# Patient Record
Sex: MALE | Race: WHITE | NOT HISPANIC OR LATINO
[De-identification: names, ages, dates, MRNs, and addresses within clinical notes are randomized per-mention and may not be internally consistent; named-entity substitution may affect disease eponyms.]

---

## 2018-07-23 ENCOUNTER — RX RENEWAL (OUTPATIENT)
Age: 68
End: 2018-07-23

## 2018-10-05 ENCOUNTER — APPOINTMENT (OUTPATIENT)
Dept: PULMONOLOGY | Facility: CLINIC | Age: 68
End: 2018-10-05

## 2018-10-08 ENCOUNTER — MEDICATION RENEWAL (OUTPATIENT)
Age: 68
End: 2018-10-08

## 2018-10-30 ENCOUNTER — APPOINTMENT (OUTPATIENT)
Dept: PULMONOLOGY | Facility: CLINIC | Age: 68
End: 2018-10-30
Payer: MEDICARE

## 2018-10-30 VITALS
BODY MASS INDEX: 24.29 KG/M2 | DIASTOLIC BLOOD PRESSURE: 57 MMHG | WEIGHT: 164 LBS | HEIGHT: 69 IN | OXYGEN SATURATION: 96 % | TEMPERATURE: 98.3 F | HEART RATE: 62 BPM | SYSTOLIC BLOOD PRESSURE: 96 MMHG

## 2018-10-30 PROCEDURE — 94060 EVALUATION OF WHEEZING: CPT

## 2018-10-30 PROCEDURE — 94729 DIFFUSING CAPACITY: CPT

## 2018-10-30 PROCEDURE — 90662 IIV NO PRSV INCREASED AG IM: CPT

## 2018-10-30 PROCEDURE — G0008: CPT

## 2018-10-30 PROCEDURE — 99214 OFFICE O/P EST MOD 30 MIN: CPT | Mod: 25

## 2018-10-30 PROCEDURE — 94727 GAS DIL/WSHOT DETER LNG VOL: CPT

## 2018-10-30 RX ORDER — BUDESONIDE AND FORMOTEROL FUMARATE DIHYDRATE 160; 4.5 UG/1; UG/1
160-4.5 AEROSOL RESPIRATORY (INHALATION) TWICE DAILY
Qty: 1 | Refills: 1 | Status: DISCONTINUED | COMMUNITY
Start: 2018-10-08 | End: 2018-10-30

## 2018-11-14 ENCOUNTER — MEDICATION RENEWAL (OUTPATIENT)
Age: 68
End: 2018-11-14

## 2019-05-21 ENCOUNTER — APPOINTMENT (OUTPATIENT)
Dept: PULMONOLOGY | Facility: CLINIC | Age: 69
End: 2019-05-21

## 2019-07-09 ENCOUNTER — APPOINTMENT (OUTPATIENT)
Dept: PULMONOLOGY | Facility: CLINIC | Age: 69
End: 2019-07-09
Payer: MEDICARE

## 2019-07-09 VITALS
OXYGEN SATURATION: 96 % | DIASTOLIC BLOOD PRESSURE: 70 MMHG | HEART RATE: 64 BPM | SYSTOLIC BLOOD PRESSURE: 110 MMHG | RESPIRATION RATE: 12 BRPM

## 2019-07-09 PROCEDURE — 77080 DXA BONE DENSITY AXIAL: CPT

## 2019-07-09 PROCEDURE — 99214 OFFICE O/P EST MOD 30 MIN: CPT | Mod: 25

## 2019-07-09 PROCEDURE — 94060 EVALUATION OF WHEEZING: CPT

## 2019-07-09 NOTE — PHYSICAL EXAM
[General Appearance - Well Nourished] : well nourished [General Appearance - Well Developed] : well developed [Heart Sounds] : normal S1 and S2 [Lungs Percussion] : the lungs were normal to percussion [Abdomen Soft] : soft [Auscultation Breath Sounds / Voice Sounds] : lungs were clear to auscultation bilaterally [Cyanosis, Localized] : no localized cyanosis [Nail Clubbing] : no clubbing of the fingernails [Abdomen Tenderness] : non-tender [] : no ischemic changes [Petechial Hemorrhages (___cm)] : no petechial hemorrhages

## 2019-07-09 NOTE — DISCUSSION/SUMMARY
[FreeTextEntry1] : Asthma meets goals. Denies significant nocturnal symptoms. Rare beta agonist use. Denies significant cough, wheezing, SOB.\par \par Osteopenia relatively stable.\par \par Bronchiectasis without recent exacerbations.

## 2019-07-09 NOTE — HISTORY OF PRESENT ILLNESS
[FreeTextEntry1] : Feeling good , increased exercise and uses proair before with help. No recent uri, no cough or wheeze\par \par Off Fosamax greater than 2 years ago and de for f/u bone density . taking vit d only

## 2019-07-09 NOTE — ASSESSMENT
[FreeTextEntry1] : Continue present bronchodilator therapy\par Calcium/VITamin D/Exercise as treatment for bone loss discussed.\par

## 2019-07-09 NOTE — PROCEDURE
[FreeTextEntry1] : Pulmonary function testing.\par These data demonstrate a mild obstructive ventilatory deficit. There is no significant bronchodilator response. \par \par BMD Ostoepenia Spine improved. Hip mild dec. BMD

## 2019-09-06 ENCOUNTER — RX RENEWAL (OUTPATIENT)
Age: 69
End: 2019-09-06

## 2020-01-28 ENCOUNTER — APPOINTMENT (OUTPATIENT)
Dept: PULMONOLOGY | Facility: CLINIC | Age: 70
End: 2020-01-28
Payer: MEDICARE

## 2020-01-28 VITALS
SYSTOLIC BLOOD PRESSURE: 108 MMHG | HEART RATE: 62 BPM | RESPIRATION RATE: 14 BRPM | BODY MASS INDEX: 23.7 KG/M2 | WEIGHT: 160 LBS | OXYGEN SATURATION: 97 % | HEIGHT: 69 IN | DIASTOLIC BLOOD PRESSURE: 58 MMHG | TEMPERATURE: 98.5 F

## 2020-01-28 DIAGNOSIS — N40.0 BENIGN PROSTATIC HYPERPLASIA WITHOUT LOWER URINARY TRACT SYMPMS: ICD-10-CM

## 2020-01-28 DIAGNOSIS — I10 ESSENTIAL (PRIMARY) HYPERTENSION: ICD-10-CM

## 2020-01-28 DIAGNOSIS — G25.0 ESSENTIAL TREMOR: ICD-10-CM

## 2020-01-28 LAB — POCT - HEMOGLOBIN (HGB), QUANTITATIVE, TRANSCUTANEOUS: 13.5

## 2020-01-28 PROCEDURE — 94727 GAS DIL/WSHOT DETER LNG VOL: CPT

## 2020-01-28 PROCEDURE — 94729 DIFFUSING CAPACITY: CPT

## 2020-01-28 PROCEDURE — 99213 OFFICE O/P EST LOW 20 MIN: CPT | Mod: 25

## 2020-01-28 PROCEDURE — 88738 HGB QUANT TRANSCUTANEOUS: CPT

## 2020-01-28 PROCEDURE — 71046 X-RAY EXAM CHEST 2 VIEWS: CPT

## 2020-01-28 PROCEDURE — 94060 EVALUATION OF WHEEZING: CPT

## 2020-01-28 RX ORDER — ALBUTEROL SULFATE 90 UG/1
108 (90 BASE) AEROSOL, METERED RESPIRATORY (INHALATION)
Qty: 1 | Refills: 5 | Status: DISCONTINUED | COMMUNITY
Start: 2018-11-14 | End: 2020-01-28

## 2020-01-28 RX ORDER — LOSARTAN POTASSIUM 100 MG/1
100 TABLET, FILM COATED ORAL
Refills: 0 | Status: ACTIVE | COMMUNITY
Start: 2020-01-28

## 2020-01-28 RX ORDER — TAMSULOSIN HYDROCHLORIDE 0.4 MG/1
0.4 CAPSULE ORAL
Refills: 0 | Status: ACTIVE | COMMUNITY
Start: 2020-01-28

## 2020-01-28 RX ORDER — ATORVASTATIN CALCIUM 40 MG/1
40 TABLET, FILM COATED ORAL
Refills: 0 | Status: ACTIVE | COMMUNITY
Start: 2020-01-28

## 2020-01-28 RX ORDER — BUDESONIDE AND FORMOTEROL FUMARATE DIHYDRATE 160; 4.5 UG/1; UG/1
160-4.5 AEROSOL RESPIRATORY (INHALATION) TWICE DAILY
Qty: 3 | Refills: 3 | Status: DISCONTINUED | COMMUNITY
Start: 2018-11-14 | End: 2020-01-28

## 2020-01-28 RX ORDER — ALPRAZOLAM 0.25 MG/1
0.25 TABLET ORAL
Refills: 0 | Status: ACTIVE | COMMUNITY
Start: 2020-01-28

## 2020-01-28 RX ORDER — PRIMIDONE 50 MG/1
50 TABLET ORAL
Refills: 0 | Status: ACTIVE | COMMUNITY
Start: 2020-01-28

## 2020-01-28 NOTE — DISCUSSION/SUMMARY
[FreeTextEntry1] : Asthma meets goals. Denies significant nocturnal symptoms. Rare beta agonist use. Denies significant cough, wheezing, SOB.\par Bronchiectasis without recent exacerbations.

## 2020-01-28 NOTE — PHYSICAL EXAM
[General Appearance - Well Developed] : well developed [General Appearance - Well Nourished] : well nourished [Heart Sounds] : normal S1 and S2 [Lungs Percussion] : the lungs were normal to percussion [Auscultation Breath Sounds / Voice Sounds] : lungs were clear to auscultation bilaterally [Abdomen Tenderness] : non-tender [Abdomen Soft] : soft [Cyanosis, Localized] : no localized cyanosis [Nail Clubbing] : no clubbing of the fingernails [] : no ischemic changes [Petechial Hemorrhages (___cm)] : no petechial hemorrhages

## 2020-01-28 NOTE — HISTORY OF PRESENT ILLNESS
[Former] : former [< 30 pack-years] : < 30 pack-years [TextBox_4] : Doing well on Symbicort 2 and 2 but feels has had some increase in dyspnea while exercising , like he is out of shape.Concerned and asking for a cxr. Last time saw cardiologist 7-8 years. proair use on average 2 times a day slight increase use than ususal. Uses before gym [TextBox_11] : .5 [TextBox_15] : 5620 [TextBox_13] : 9

## 2020-01-28 NOTE — PROCEDURE
[FreeTextEntry1] : \par 01/28/2020\par Pulmonary function testing\par These data demonstrate a mild obstructive ventilatory deficit. There is no significant bronchodilator response. Normal Lung Volumes. There is a mild diffusion impairment. \par PFT attached relatively stable function.\par \par \par 01/28/2020 \par PA and lateral chest radiograph reveals A normal heart and mediastinum. He is kyphotic. Bony structures are intact. There is no significant pleural disease. Lung fields are clear bilaterally with no acute infiltrates.\par \par No significant change compared to June 17, 2014\par . \par \par

## 2020-05-21 ENCOUNTER — RX RENEWAL (OUTPATIENT)
Age: 70
End: 2020-05-21

## 2020-07-27 DIAGNOSIS — Z20.828 CONTACT WITH AND (SUSPECTED) EXPOSURE TO OTHER VIRAL COMMUNICABLE DISEASES: ICD-10-CM

## 2020-09-29 ENCOUNTER — APPOINTMENT (OUTPATIENT)
Dept: DISASTER EMERGENCY | Facility: CLINIC | Age: 70
End: 2020-09-29

## 2020-09-29 DIAGNOSIS — Z01.818 ENCOUNTER FOR OTHER PREPROCEDURAL EXAMINATION: ICD-10-CM

## 2020-09-30 LAB — SARS-COV-2 N GENE NPH QL NAA+PROBE: NOT DETECTED

## 2020-10-02 ENCOUNTER — APPOINTMENT (OUTPATIENT)
Dept: PULMONOLOGY | Facility: CLINIC | Age: 70
End: 2020-10-02
Payer: MEDICARE

## 2020-10-02 VITALS
SYSTOLIC BLOOD PRESSURE: 110 MMHG | DIASTOLIC BLOOD PRESSURE: 64 MMHG | OXYGEN SATURATION: 93 % | TEMPERATURE: 98 F | HEART RATE: 57 BPM

## 2020-10-02 PROCEDURE — 94727 GAS DIL/WSHOT DETER LNG VOL: CPT

## 2020-10-02 PROCEDURE — 94010 BREATHING CAPACITY TEST: CPT

## 2020-10-02 PROCEDURE — 94729 DIFFUSING CAPACITY: CPT

## 2020-10-02 PROCEDURE — ZZZZZ: CPT

## 2020-10-02 PROCEDURE — 99213 OFFICE O/P EST LOW 20 MIN: CPT | Mod: 25

## 2020-10-02 PROCEDURE — G0008: CPT

## 2020-10-02 PROCEDURE — 90662 IIV NO PRSV INCREASED AG IM: CPT

## 2020-10-02 RX ORDER — BUDESONIDE AND FORMOTEROL FUMARATE DIHYDRATE 160; 4.5 UG/1; UG/1
160-4.5 AEROSOL RESPIRATORY (INHALATION) TWICE DAILY
Qty: 1 | Refills: 5 | Status: DISCONTINUED | COMMUNITY
Start: 2020-01-28 | End: 2020-10-02

## 2020-10-02 NOTE — PHYSICAL EXAM
[General Appearance - Well Developed] : well developed [General Appearance - Well Nourished] : well nourished [Heart Sounds] : normal S1 and S2 [Auscultation Breath Sounds / Voice Sounds] : lungs were clear to auscultation bilaterally [Lungs Percussion] : the lungs were normal to percussion [Abdomen Soft] : soft [Abdomen Tenderness] : non-tender [Nail Clubbing] : no clubbing of the fingernails [Cyanosis, Localized] : no localized cyanosis [Petechial Hemorrhages (___cm)] : no petechial hemorrhages [] : no ischemic changes [Normal S1, S2] : normal s1, s2

## 2020-10-03 NOTE — HISTORY OF PRESENT ILLNESS
[< 30 pack-years] : < 30 pack-years [TextBox_4] : on Symbicort 160 1-2 puffs bid\par asking can he go to lower dose\par \par has post nasal drip , saw ENT had wax removed form ear\par \par uses Allegra seasonally \par \par using proair with exercise\par \par No respiratory infections. [TextBox_11] : .5 [TextBox_13] : 8 [YearQuit] : 1980

## 2020-10-03 NOTE — ASSESSMENT
[FreeTextEntry1] : Continue present bronchodilator therapy\par Continue exercise program\par Follow-up in 6 months or sooner on a PRN basis.

## 2020-10-03 NOTE — REVIEW OF SYSTEMS
[Dyspnea] : dyspnea [Nocturia] : nocturia [Negative] : Endocrine [TextBox_30] : HPI  [TextBox_44] : htn

## 2020-10-03 NOTE — PROCEDURE
[FreeTextEntry1] : \par 10/02/2020\par Pulmonary function testing\par These data demonstrate a mild obstructive ventilatory deficit. Normal Lung Volumes. There is a mild diffusion impairment. Corrects to normal with lung volume correction \par \par \par 01/28/2020 \par PA and lateral chest radiograph reveals A normal heart and mediastinum. He is kyphotic. Bony structures are intact. There is no significant pleural disease. Lung fields are clear bilaterally with no acute infiltrates.\par \par No significant change compared to June 17, 2014\par . \par \par

## 2021-01-04 ENCOUNTER — RX RENEWAL (OUTPATIENT)
Age: 71
End: 2021-01-04

## 2021-04-16 ENCOUNTER — RX RENEWAL (OUTPATIENT)
Age: 71
End: 2021-04-16

## 2021-05-21 ENCOUNTER — APPOINTMENT (OUTPATIENT)
Dept: PULMONOLOGY | Facility: CLINIC | Age: 71
End: 2021-05-21
Payer: MEDICARE

## 2021-05-21 VITALS
OXYGEN SATURATION: 97 % | HEART RATE: 55 BPM | DIASTOLIC BLOOD PRESSURE: 63 MMHG | SYSTOLIC BLOOD PRESSURE: 118 MMHG | TEMPERATURE: 97.8 F

## 2021-05-21 DIAGNOSIS — H93.19 TINNITUS, UNSPECIFIED EAR: ICD-10-CM

## 2021-05-21 PROCEDURE — 99214 OFFICE O/P EST MOD 30 MIN: CPT

## 2021-05-21 NOTE — PHYSICAL EXAM
[Normal S1, S2] : normal s1, s2 [General Appearance - Well Developed] : well developed [General Appearance - Well Nourished] : well nourished [Heart Sounds] : normal S1 and S2 [Auscultation Breath Sounds / Voice Sounds] : lungs were clear to auscultation bilaterally [Lungs Percussion] : the lungs were normal to percussion [Abdomen Soft] : soft [Abdomen Tenderness] : non-tender [Cyanosis, Localized] : no localized cyanosis [Nail Clubbing] : no clubbing of the fingernails [Petechial Hemorrhages (___cm)] : no petechial hemorrhages [] : no ischemic changes

## 2021-05-21 NOTE — DISCUSSION/SUMMARY
[FreeTextEntry1] : Asthma meets goals. Denies significant nocturnal symptoms. Rare beta agonist use. Denies significant cough, wheezing, SOB.\par Bronchiectasis without recent exacerbations. \par Tinnitus.  Discussed treatment options.

## 2021-05-21 NOTE — HISTORY OF PRESENT ILLNESS
[< 30 pack-years] : < 30 pack-years [TextBox_4] : on Symbicort 160 1 puffs bid\par \par Has post nasal drip , saw ENT  now has tinnitus hearing believes OK.\par \par uses Allegra seasonally \par no mucus. No cough or wheeze.\par using proair with exercise and rare otherwise. \par \par No respiratory infections.\par \par Got vaccine for covid [TextBox_11] : .5 [TextBox_13] : 8 [YearQuit] : 1980

## 2021-05-21 NOTE — ASSESSMENT
[FreeTextEntry1] : Referred to [facility].\par Discussed hearing aids.\par Continue present bronchodilator therapy\par Continue exercise program\par Follow-up in 6 months or sooner on a PRN basis.

## 2021-08-23 ENCOUNTER — APPOINTMENT (OUTPATIENT)
Dept: PULMONOLOGY | Facility: CLINIC | Age: 71
End: 2021-08-23

## 2021-09-17 ENCOUNTER — APPOINTMENT (OUTPATIENT)
Dept: PULMONOLOGY | Facility: CLINIC | Age: 71
End: 2021-09-17

## 2021-09-24 DIAGNOSIS — Z01.812 ENCOUNTER FOR PREPROCEDURAL LABORATORY EXAMINATION: ICD-10-CM

## 2021-09-28 ENCOUNTER — APPOINTMENT (OUTPATIENT)
Dept: DISASTER EMERGENCY | Facility: CLINIC | Age: 71
End: 2021-09-28

## 2021-09-29 LAB — SARS-COV-2 N GENE NPH QL NAA+PROBE: NOT DETECTED

## 2021-10-01 ENCOUNTER — APPOINTMENT (OUTPATIENT)
Dept: PULMONOLOGY | Facility: CLINIC | Age: 71
End: 2021-10-01
Payer: MEDICARE

## 2021-10-01 VITALS
BODY MASS INDEX: 23.7 KG/M2 | OXYGEN SATURATION: 100 % | SYSTOLIC BLOOD PRESSURE: 113 MMHG | WEIGHT: 160 LBS | HEART RATE: 53 BPM | RESPIRATION RATE: 14 BRPM | DIASTOLIC BLOOD PRESSURE: 64 MMHG | TEMPERATURE: 97.4 F | HEIGHT: 69 IN

## 2021-10-01 DIAGNOSIS — M85.80 OTHER SPECIFIED DISORDERS OF BONE DENSITY AND STRUCTURE, UNSPECIFIED SITE: ICD-10-CM

## 2021-10-01 PROCEDURE — 90662 IIV NO PRSV INCREASED AG IM: CPT

## 2021-10-01 PROCEDURE — 94010 BREATHING CAPACITY TEST: CPT

## 2021-10-01 PROCEDURE — 94727 GAS DIL/WSHOT DETER LNG VOL: CPT

## 2021-10-01 PROCEDURE — 77085 DXA BONE DENSITY AXL VRT FX: CPT

## 2021-10-01 PROCEDURE — 99214 OFFICE O/P EST MOD 30 MIN: CPT | Mod: 25

## 2021-10-01 PROCEDURE — 94729 DIFFUSING CAPACITY: CPT

## 2021-10-01 PROCEDURE — ZZZZZ: CPT

## 2021-10-01 PROCEDURE — G0008: CPT

## 2021-10-01 NOTE — HISTORY OF PRESENT ILLNESS
[< 30 pack-years] : < 30 pack-years [TextBox_4] : on Symbicort 160 1 puffs bid or 2 puffs prn\par \par no cough or mucus or wheeze, just post nasal drip\par uses albuterol if does exercise\par using 2 -3 a week\par \par uses Allegra seasonally \par \par \par No respiratory infections.\par \par Got vaccine for COVID and booster [TextBox_11] : .5 [TextBox_13] : 8 [YearQuit] : 1980

## 2021-10-01 NOTE — ASSESSMENT
[FreeTextEntry1] : Calcium/VITamin D/Exercise as treatment for bone loss discussed.\par Continue present bronchodilator therapy\par Continue exercise program\par Follow-up in 6 months or sooner on a PRN basis.

## 2021-10-01 NOTE — PROCEDURE
[FreeTextEntry1] : 10/01/2021\par Pulmonary function testing\par These data demonstrate a moderate obstructive ventilatory deficit. Normal Lung Volumes. There is a mild-mod diffusion impairment Corrects to mild with lung volume correction \par PFT attached relatively stable function.\par \par \par BMD feels osteopenia of the left hip and essentially normal bone mineral density of the lumbosacral spine.\par

## 2021-10-01 NOTE — DISCUSSION/SUMMARY
[FreeTextEntry1] : Asthma meets goals. Denies significant nocturnal symptoms. Rare beta agonist use. Denies significant cough, wheezing, SOB.\par Bronchiectasis without recent exacerbations. \par Tinnitus.now  intermittent\par Osteopenia.  Patient is status post treatment.

## 2021-10-01 NOTE — PHYSICAL EXAM
[Normal S1, S2] : normal s1, s2 [General Appearance - Well Developed] : well developed [General Appearance - Well Nourished] : well nourished [Heart Sounds] : normal S1 and S2 [Auscultation Breath Sounds / Voice Sounds] : lungs were clear to auscultation bilaterally [Lungs Percussion] : the lungs were normal to percussion [Abdomen Soft] : soft [Abdomen Tenderness] : non-tender [Nail Clubbing] : no clubbing of the fingernails [Cyanosis, Localized] : no localized cyanosis [Petechial Hemorrhages (___cm)] : no petechial hemorrhages [] : no ischemic changes

## 2021-11-25 ENCOUNTER — RX RENEWAL (OUTPATIENT)
Age: 71
End: 2021-11-25

## 2021-12-29 ENCOUNTER — APPOINTMENT (OUTPATIENT)
Dept: ORTHOPEDIC SURGERY | Facility: CLINIC | Age: 71
End: 2021-12-29
Payer: MEDICARE

## 2021-12-29 VITALS — WEIGHT: 145 LBS | BODY MASS INDEX: 21.48 KG/M2 | HEIGHT: 69 IN

## 2021-12-29 DIAGNOSIS — M66.242 SPONTANEOUS RUPTURE OF EXTENSOR TENDONS, LEFT HAND: ICD-10-CM

## 2021-12-29 DIAGNOSIS — M18.12 UNILATERAL PRIMARY OSTEOARTHRITIS OF FIRST CARPOMETACARPAL JOINT, LEFT HAND: ICD-10-CM

## 2021-12-29 DIAGNOSIS — M18.11 UNILATERAL PRIMARY OSTEOARTHRITIS OF FIRST CARPOMETACARPAL JOINT, RIGHT HAND: ICD-10-CM

## 2021-12-29 PROCEDURE — 99203 OFFICE O/P NEW LOW 30 MIN: CPT

## 2021-12-30 PROBLEM — M66.242 NONTRAUMATIC SUBLUXATION OF EXTENSOR TENDON AT METACARPOPHALANGEAL JOINT OF LEFT HAND: Status: ACTIVE | Noted: 2021-12-30

## 2021-12-30 NOTE — HISTORY OF PRESENT ILLNESS
DAILY PROGRESS NOTE      POD: 1    Patient doing well  Vitals:    18 0024   BP: (!) 107/42   Pulse: 63   Resp: 16   Temp: 98.1 °F (36.7 °C)   SpO2: 95%      Temp (24hrs), Av °F (35.6 °C), Min:95.5 °F (35.3 °C), Max:98.1 °F (36.7 °C)       Pain Control Good  No chest pain or shortness of breath. No calf pain    Exam:   Incision(s): clean- pt has previna incisional vac on  Dressing clean, dry, and intact- good suction, no leaks- drain was pulled area was sealed off  extremity shows neuro vascular status intact. Flexion and extension intact  Calves soft and non-tender without evidence of DVT. .      Labs reviewed:  CBC:   Recent Labs      18   1342  18   0537   WBC  16.3*  12.8*   HGB  11.6*  9.8*   PLT  286  231     BMP:    Recent Labs      18   0537   NA  137   K  4.7   CL  102   CO2  25   BUN  18   CREATININE  0.77   GLUCOSE  144*       I&O  I/O last 3 completed shifts: In: 3080 [P.O.:480; I.V.:2600]  Out: 1055 [Urine:500; Drains:130; Blood:425]      Assessment:  Good Post Operative Course.    Acute blood loss anemia- with drain and during surgery   partially dilutional anemia- d/t fluids being given  No acute s/s of bleeding noted   pt does have previna incisional vac on- we will continue monitoring    Plan:  PT/ OT and mobility with discharge planning for later today if pain under control, V/S stable and no dizziness    Ezella Level CNP  2018 7:03 AM [FreeTextEntry1] : The patient is a 71 year-old primarily LHD male who presents with complaints of "basal arthritis" in hands.\par Pt saw Malcom Summers MD, approx 3 weeks ago. Pt advised that he has bilateral basal joint arthritis.\par Dr. Summers recommended surgical treatment.\par Pt reports no pain at rest. No pain sleeping. Pain when abducting thumb, holding a glass.\par Patient states that in regards to bilateral basal joint discomfort, "right now my hands seem ok", in reference to basal joints.\par Patient states that recently basal joints had been minimally painful. There is stiffness but that is nonpainful.\par Patient reports that he is able to do most activities with the thumbs although power pinch and certain gripping activities are difficult.\par In large part the patient is relatively comfortable and reports little to no disability.\par \par Patient also notes snapping of left ring finger, which is not particularly painful. Patient is under the believe that this is a "trigger finger". However, on exam EDC 4 subluxes ulnarly and relocates with a snap.  There is no obvious flexor tendon triggering in this digit. 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

## 2021-12-30 NOTE — PHYSICAL EXAM
[de-identified] : Left wrist:\par Good range of motion without localizing findings\par Left first metacarpal adducted with considerable stiffness\par Basal joint manipulation no crepitus mild discomfort\par No notable joint line tenderness\par MP hyperextension maximum 30 degrees passively\par Active MP extension +15 degrees\par Full composite flexion MP and IP joints 60 degrees +75 degrees\par EDC 4 subluxes ulnarly and snaps into place when ring finger extensor maximally flexed position\par This creates a vibration but is not flexor tendon triggering\par Ring finger A1 pulley nontender. No active or provocable triggering of left ring finger flexor tendon\par No A1 pulley tenderness and no triggering in any finger.\par No pertinent MP, PIP, or DIP joint contributory findings, except some Heberden's nodes; none are clinically painful.\par \par Right hand and wrist:\par Good range of motion without localizing findings\par Right first metacarpal adducted with considerable stiffness\par Basal joint manipulation no crepitus mild discomfort\par No notable joint line tenderness\par MP hyperextension maximum 30 degrees passively\par Active MP extension +15 degrees\par Full composite flexion MP and IP joints 60 degrees +75 degrees\par No pertinent MP, PIP, or DIP joint contributory findings, except some Heberden's nodes; none are clinically painful.\par No A1 pulley tenderness and no triggering in any finger.\par No evidence of subluxing EDC tendons or associated snapping of fingers/tendons\par \par Neurologic: Median, ulnar, and radial motor and sensory are intact. \par Skin: No cyanosis, clubbing, or rashes.\par Vascular: Radial pulses intact.\par Lymphatic: No streaking or epitrochlear adenopathy.\par The patient is awake, alert, and oriented. Affect appropriate. Cooperative.  [de-identified] : Copies of radiographs [facility]\par 3 views right hand and wrist\par Basal joint sclerosis large osteophyte formation of trapezium radially and ulnarly\par Trapezium-first metacarpal space is absent\par Sclerosis of distal trapezium proximal first metacarpal\par No other notable wrist carpus MP or IP changes\par \par 3 views left hand and wrist\par Similar to the right\par Stage III basal joint arthritis with sclerosis, large osteophytes distal radial and ulnar corners of trapezium\par Virtually complete loss of joint space\par Deeper concavity in distal surface of trapezium\par No other notable degenerative changes in wrist or remainder of hand

## 2021-12-30 NOTE — DISCUSSION/SUMMARY
[FreeTextEntry1] : The patient has bilateral basal joint arthritis radiographically stage III. There is considerable stiffness and minimal movement at the basal joints bilaterally. During the examination the patient had minimal to no pain. Mild discomfort only at most with considerable force applied to the basal joint to try to provoke discomfort.\par With questioning the patient explains that he has recently been having very little pain at basal joints. He questions the stiffness and I have explained that with stiffness there is less pain and that it is a limitation that is reasonable to accept.\par The snapping of the left ring finger is not flexor tendon triggering but rather subluxed EDC tendon that relocates one finger goes from flexion to extension causes the snap. There is no A1 pulley tenderness, active triggering or provocable triggering this digit or any other digit.\par I reviewed pros, cons, risk, benefits with patient. For the time being the patient is content to continue with the discomfort that he currently has which he judges to be relatively mild when it is present. He has minimal to no pain much of the time.\par For these reasons basal joint arthroplasty, though indicated because of the severity of arthritis, is not currently indicated because the patient is not sufficiently symptomatic to justify surgery and, in my opinion.\par I have reviewed these many other factors at length and in detail.\par Return as needed.\par If the patient does want to have surgery I have advised him to return to Malcom Summers MD whom he saw first.\par  A lengthy and detailed discussion was held with the patient regarding analysis, treatment, and recommendations. All questions have been answered. At the conclusion the patient expressed acceptance and understanding.

## 2022-02-07 ENCOUNTER — APPOINTMENT (RX ONLY)
Dept: URBAN - METROPOLITAN AREA CLINIC 97 | Facility: CLINIC | Age: 72
Setting detail: DERMATOLOGY
End: 2022-02-07

## 2022-02-07 DIAGNOSIS — L85.3 XEROSIS CUTIS: ICD-10-CM

## 2022-02-07 DIAGNOSIS — L81.7 PIGMENTED PURPURIC DERMATOSIS: ICD-10-CM

## 2022-02-07 PROCEDURE — ? TREATMENT REGIMEN

## 2022-02-07 PROCEDURE — ? PRESCRIPTION

## 2022-02-07 PROCEDURE — ? COUNSELING

## 2022-02-07 PROCEDURE — 99203 OFFICE O/P NEW LOW 30 MIN: CPT

## 2022-02-07 RX ORDER — TRIAMCINOLONE ACETONIDE 1 MG/G
SMALL AMOUNT CREAM TOPICAL BID
Qty: 454 | Refills: 2 | Status: ERX | COMMUNITY
Start: 2022-02-07

## 2022-02-07 RX ADMIN — TRIAMCINOLONE ACETONIDE SMALL AMOUNT: 1 CREAM TOPICAL at 00:00

## 2022-02-07 ASSESSMENT — LOCATION DETAILED DESCRIPTION DERM
LOCATION DETAILED: RIGHT DISTAL PRETIBIAL REGION
LOCATION DETAILED: LEFT PROXIMAL RADIAL DORSAL FOREARM
LOCATION DETAILED: LEFT PROXIMAL PRETIBIAL REGION
LOCATION DETAILED: RIGHT PROXIMAL DORSAL FOREARM

## 2022-02-07 ASSESSMENT — LOCATION ZONE DERM
LOCATION ZONE: ARM
LOCATION ZONE: LEG

## 2022-02-07 ASSESSMENT — LOCATION SIMPLE DESCRIPTION DERM
LOCATION SIMPLE: LEFT PRETIBIAL REGION
LOCATION SIMPLE: RIGHT FOREARM
LOCATION SIMPLE: RIGHT PRETIBIAL REGION
LOCATION SIMPLE: LEFT FOREARM

## 2022-06-17 ENCOUNTER — APPOINTMENT (OUTPATIENT)
Dept: PULMONOLOGY | Facility: CLINIC | Age: 72
End: 2022-06-17
Payer: MEDICARE

## 2022-06-17 VITALS
TEMPERATURE: 96.5 F | WEIGHT: 145 LBS | SYSTOLIC BLOOD PRESSURE: 105 MMHG | HEIGHT: 69 IN | DIASTOLIC BLOOD PRESSURE: 64 MMHG | BODY MASS INDEX: 21.48 KG/M2 | OXYGEN SATURATION: 97 % | HEART RATE: 56 BPM

## 2022-06-17 PROCEDURE — 95012 NITRIC OXIDE EXP GAS DETER: CPT

## 2022-06-17 PROCEDURE — 94010 BREATHING CAPACITY TEST: CPT

## 2022-06-17 PROCEDURE — 99213 OFFICE O/P EST LOW 20 MIN: CPT | Mod: 25

## 2022-06-17 RX ORDER — FLUTICASONE FUROATE AND VILANTEROL TRIFENATATE 100; 25 UG/1; UG/1
100-25 POWDER RESPIRATORY (INHALATION) DAILY
Qty: 1 | Refills: 5 | Status: DISCONTINUED | COMMUNITY
Start: 2021-10-01 | End: 2022-06-17

## 2022-06-18 NOTE — HISTORY OF PRESENT ILLNESS
[TextBox_4] : S/P wrist surgery for osteoarthritis.\par \par on Symbicort 160 1 puffs bid.\par no cough or mucus or wheeze, just post nasal drip\par uses albuterol if does exercise\par using 2 -3 a week\par \par uses Allegra seasonally \par \par  [TextBox_11] : .5 [TextBox_13] : 8 [YearQuit] : 1980

## 2022-06-18 NOTE — DISCUSSION/SUMMARY
[FreeTextEntry1] : Asthma meets goals. Denies significant nocturnal symptoms. Rare beta agonist use. Denies significant cough, wheezing, SOB.\par Bronchiectasis without recent exacerbations. \par Tinnitus.now  intermittent\par Osteopenia.  Patient is status post treatment.\par Issues with osteoarthritis.

## 2022-06-18 NOTE — ASSESSMENT
[FreeTextEntry1] : Calcium/VITamin D/Exercise as treatment for bone loss discussed.\par Trial of decrease Symbicort to 80.\par Continue exercise program\par Follow-up in 6 months or sooner on a PRN basis.

## 2022-06-18 NOTE — PROCEDURE
[FreeTextEntry1] : 06/17/2022\par Pulmonary function testing\par These data demonstrate a mild obstructive ventilatory deficit. \par Stable flow rates.\par NIOX 31\par \par PFT attached relatively stable function.\par \par \par BMD feels osteopenia of the left hip and essentially normal bone mineral density of the lumbosacral spine.\par

## 2022-08-12 ENCOUNTER — EMERGENCY (EMERGENCY)
Facility: HOSPITAL | Age: 72
LOS: 1 days | Discharge: HOME | End: 2022-08-12
Attending: EMERGENCY MEDICINE | Admitting: EMERGENCY MEDICINE
Payer: MEDICARE

## 2022-08-12 VITALS
WEIGHT: 145.06 LBS | RESPIRATION RATE: 18 BRPM | HEART RATE: 53 BPM | SYSTOLIC BLOOD PRESSURE: 159 MMHG | TEMPERATURE: 98 F | HEIGHT: 69 IN | OXYGEN SATURATION: 100 % | DIASTOLIC BLOOD PRESSURE: 75 MMHG

## 2022-08-12 VITALS
RESPIRATION RATE: 17 BRPM | OXYGEN SATURATION: 100 % | HEART RATE: 61 BPM | DIASTOLIC BLOOD PRESSURE: 72 MMHG | TEMPERATURE: 98 F | SYSTOLIC BLOOD PRESSURE: 142 MMHG

## 2022-08-12 DIAGNOSIS — Z98.89 OTHER SPECIFIED POSTPROCEDURAL STATES: Chronic | ICD-10-CM

## 2022-08-12 LAB
ALBUMIN SERPL ELPH-MCNC: 3.6 G/DL — SIGNIFICANT CHANGE UP (ref 3.3–5)
ALP SERPL-CCNC: 81 U/L — SIGNIFICANT CHANGE UP (ref 30–120)
ALT FLD-CCNC: 25 U/L DA — SIGNIFICANT CHANGE UP (ref 10–60)
ANION GAP SERPL CALC-SCNC: 6 MMOL/L — SIGNIFICANT CHANGE UP (ref 5–17)
APTT BLD: 27.8 SEC — SIGNIFICANT CHANGE UP (ref 27.5–35.5)
AST SERPL-CCNC: 16 U/L — SIGNIFICANT CHANGE UP (ref 10–40)
BASOPHILS # BLD AUTO: 0.02 K/UL — SIGNIFICANT CHANGE UP (ref 0–0.2)
BASOPHILS NFR BLD AUTO: 0.2 % — SIGNIFICANT CHANGE UP (ref 0–2)
BILIRUB SERPL-MCNC: 0.3 MG/DL — SIGNIFICANT CHANGE UP (ref 0.2–1.2)
BUN SERPL-MCNC: 31 MG/DL — HIGH (ref 7–23)
CALCIUM SERPL-MCNC: 8.7 MG/DL — SIGNIFICANT CHANGE UP (ref 8.4–10.5)
CHLORIDE SERPL-SCNC: 106 MMOL/L — SIGNIFICANT CHANGE UP (ref 96–108)
CO2 SERPL-SCNC: 29 MMOL/L — SIGNIFICANT CHANGE UP (ref 22–31)
CREAT SERPL-MCNC: 1.3 MG/DL — SIGNIFICANT CHANGE UP (ref 0.5–1.3)
EGFR: 58 ML/MIN/1.73M2 — LOW
EOSINOPHIL # BLD AUTO: 0.06 K/UL — SIGNIFICANT CHANGE UP (ref 0–0.5)
EOSINOPHIL NFR BLD AUTO: 0.7 % — SIGNIFICANT CHANGE UP (ref 0–6)
GLUCOSE SERPL-MCNC: 108 MG/DL — HIGH (ref 70–99)
HCT VFR BLD CALC: 36.4 % — LOW (ref 39–50)
HGB BLD-MCNC: 12.3 G/DL — LOW (ref 13–17)
IMM GRANULOCYTES NFR BLD AUTO: 0.2 % — SIGNIFICANT CHANGE UP (ref 0–1.5)
INR BLD: 1 RATIO — SIGNIFICANT CHANGE UP (ref 0.88–1.16)
LIDOCAIN IGE QN: 227 U/L — SIGNIFICANT CHANGE UP (ref 73–393)
LYMPHOCYTES # BLD AUTO: 1.46 K/UL — SIGNIFICANT CHANGE UP (ref 1–3.3)
LYMPHOCYTES # BLD AUTO: 17.8 % — SIGNIFICANT CHANGE UP (ref 13–44)
MCHC RBC-ENTMCNC: 30.7 PG — SIGNIFICANT CHANGE UP (ref 27–34)
MCHC RBC-ENTMCNC: 33.8 GM/DL — SIGNIFICANT CHANGE UP (ref 32–36)
MCV RBC AUTO: 90.8 FL — SIGNIFICANT CHANGE UP (ref 80–100)
MONOCYTES # BLD AUTO: 0.76 K/UL — SIGNIFICANT CHANGE UP (ref 0–0.9)
MONOCYTES NFR BLD AUTO: 9.3 % — SIGNIFICANT CHANGE UP (ref 2–14)
NEUTROPHILS # BLD AUTO: 5.89 K/UL — SIGNIFICANT CHANGE UP (ref 1.8–7.4)
NEUTROPHILS NFR BLD AUTO: 71.8 % — SIGNIFICANT CHANGE UP (ref 43–77)
NRBC # BLD: 0 /100 WBCS — SIGNIFICANT CHANGE UP (ref 0–0)
PLATELET # BLD AUTO: 183 K/UL — SIGNIFICANT CHANGE UP (ref 150–400)
POTASSIUM SERPL-MCNC: 3.7 MMOL/L — SIGNIFICANT CHANGE UP (ref 3.5–5.3)
POTASSIUM SERPL-SCNC: 3.7 MMOL/L — SIGNIFICANT CHANGE UP (ref 3.5–5.3)
PROT SERPL-MCNC: 6.7 G/DL — SIGNIFICANT CHANGE UP (ref 6–8.3)
PROTHROM AB SERPL-ACNC: 11.5 SEC — SIGNIFICANT CHANGE UP (ref 10.5–13.4)
RBC # BLD: 4.01 M/UL — LOW (ref 4.2–5.8)
RBC # FLD: 12.1 % — SIGNIFICANT CHANGE UP (ref 10.3–14.5)
SODIUM SERPL-SCNC: 141 MMOL/L — SIGNIFICANT CHANGE UP (ref 135–145)
TROPONIN I, HIGH SENSITIVITY RESULT: 7.1 NG/L — SIGNIFICANT CHANGE UP
WBC # BLD: 8.21 K/UL — SIGNIFICANT CHANGE UP (ref 3.8–10.5)
WBC # FLD AUTO: 8.21 K/UL — SIGNIFICANT CHANGE UP (ref 3.8–10.5)

## 2022-08-12 PROCEDURE — 83690 ASSAY OF LIPASE: CPT

## 2022-08-12 PROCEDURE — 85730 THROMBOPLASTIN TIME PARTIAL: CPT

## 2022-08-12 PROCEDURE — 85025 COMPLETE CBC W/AUTO DIFF WBC: CPT

## 2022-08-12 PROCEDURE — 84484 ASSAY OF TROPONIN QUANT: CPT

## 2022-08-12 PROCEDURE — 36415 COLL VENOUS BLD VENIPUNCTURE: CPT

## 2022-08-12 PROCEDURE — 99285 EMERGENCY DEPT VISIT HI MDM: CPT

## 2022-08-12 PROCEDURE — 99285 EMERGENCY DEPT VISIT HI MDM: CPT | Mod: 25

## 2022-08-12 PROCEDURE — 70450 CT HEAD/BRAIN W/O DYE: CPT | Mod: MA

## 2022-08-12 PROCEDURE — 93010 ELECTROCARDIOGRAM REPORT: CPT

## 2022-08-12 PROCEDURE — 70450 CT HEAD/BRAIN W/O DYE: CPT | Mod: 26,MA

## 2022-08-12 PROCEDURE — 36000 PLACE NEEDLE IN VEIN: CPT | Mod: XU

## 2022-08-12 PROCEDURE — 85610 PROTHROMBIN TIME: CPT

## 2022-08-12 PROCEDURE — 93005 ELECTROCARDIOGRAM TRACING: CPT

## 2022-08-12 PROCEDURE — 80053 COMPREHEN METABOLIC PANEL: CPT

## 2022-08-12 NOTE — ED PROVIDER NOTE - CARE PROVIDER_API CALL
Arianne Camp  NEUROLOGY  [address] [place] [address]  Phone: [phone]  Fax: [phone]  Follow Up Time:

## 2022-08-12 NOTE — ED PROVIDER NOTE - NSFOLLOWUPINSTRUCTIONS_ED_ALL_ED_FT
1) Follow-up with Dr. Orozco and Dr. Camp. Call today / next business day for prompt follow-up.  2) Return to Emergency room for any worsening or persistent pain, weakness, fever, or any other concerning symptoms.  3) See attached instruction sheets for additional information, including information regarding signs and symptoms to look out for, reasons to seek immediate care and other important instructions.  4) Follow-up with any specialists as discussed / noted as well.      WHAT YOU NEED TO KNOW:    Dizziness is a feeling of being off balance or unsteady. Common causes of dizziness are an inner ear fluid imbalance or a lack of oxygen in your blood. Dizziness may be acute (lasts 3 days or less) or chronic (lasts longer than 3 days). You may have dizzy spells that last from seconds to a few hours.     DISCHARGE INSTRUCTIONS:    Return to the emergency department if:   •You have a headache and a stiff neck.      •You have shaking chills and a fever.       •You vomit over and over with no relief.       •Your vomit or bowel movements are red or black.       •You have pain in your chest, back, or abdomen.       •You have numbness, especially in your face, arms, or legs.       •You have trouble moving your arms or legs.       •You are confused.       Contact your healthcare provider if:   •You have a fever.       •Your symptoms do not get better with treatment.       •You have questions or concerns about your condition or care.       Manage your symptoms:   •Do not drive or operate heavy machinery when you are dizzy.       •Get up slowly from sitting or lying down.       •Drink plenty of liquids. Liquids help prevent dehydration. Ask how much liquid to drink each day and which liquids are best for you.      Follow up with your doctor as directed: Write down your questions so you remember to ask them during your visits.

## 2022-08-12 NOTE — ED PROVIDER NOTE - NS ED ROS FT
Constitutional: - Fever, - Chills, - Anorexia, - Fatigue, - Night sweats  Eyes: - Discharge, - Irritation, - Redness, - Visual changes, - Light sensitivity, - Pain  EARS: - Ear Pain, - Tinnitus, - Decreased hearing  NOSE: - Congestion, - Epistaxis  MOUTH/THROAT: - Vocal Changes, - Drooling, - Sore throat  NECK: - Lumps, - Stiffness, - Pain  CV: - Palpitations, - Chest Pain, - Edema, - Syncope  RESP:  - Cough, - Shortness of Breath, - Dyspnea on Exertion, - Trouble speaking, - Pleuritic pain - Wheezing  GI: - Diarrhea, - Constipation, - Bloody stools, - Nausea, - Vomiting, - Abdominal Pain  : - Dysuria, -Frequency, - Hematuria, - Hesitancy, - Incontinence, - Saddle Anesthesia, - Abnormal discharge  MSK: - Myalgias, - Arthralgias, - Weakness, - Deformities, - Injuries  SKIN: - Color change, - Rash, - Swelling, - Ecchymosis, - Abrasion, - laceration  NEURO: - Change in behavior, - Dec. Alertness, - Headache, +Dizziness, - Change in speech, - Weakness, - Seizure-like activity, - Difficulty ambulating

## 2022-08-12 NOTE — ED ADULT TRIAGE NOTE - AS TEMP SITE
oral
Jd Mathis)  Obstetrics and Gynecology  [address] [place] [address]  Phone: [phone]  Fax: [phone]  Follow Up Time: 1 month

## 2022-08-12 NOTE — ED PROVIDER NOTE - OBJECTIVE STATEMENT
71-year-old male history of anxiety asthma high cholesterol hypertension complaining about feeling dizzy around 4 or 4:30 PM and noticed right arm heaviness no slurred speech no visual changes no chest pain no shortness of breath.  Took 2 baby aspirin's.  Seen his cardiologist recently Dr. Orozco had a negative echo and negative carotid Dopplers. 71-year-old male history of anxiety asthma high cholesterol hypertension complaining about feeling dizzy around 4 or 4:30 PM and noticed right arm heaviness no slurred speech no visual changes no chest pain no shortness of breath that has now resolved.  Took 2 baby aspirin's and 0.5mg xanax. Seen his cardiologist recently Dr. Orozco had a negative echo and negative carotid Dopplers.

## 2022-08-12 NOTE — ED ADULT NURSE NOTE - OBJECTIVE STATEMENT
pt comes to ed c/o pt comes to ed c/o chronic right shoulder pain, and right arm heaviness, pt states he goes to OT for right shoulder nerve pain and states  he also went to chiropractor yesterday thinks that's what may have caused it, pt also noted around 430 his right arm felt "heavy" for a brief minute then resolved. pt states he feels back to normal now, denies any pain right now upon assessment and thinks this could all be caused from his hx of anxiety and panic attacks. pt denies numb, tingling, weakness, slurred speech. pt maex4 with strength and purpose. no focal deficits noted.

## 2022-08-12 NOTE — ED PROVIDER NOTE - PATIENT PORTAL LINK FT
You can access the FollowMyHealth Patient Portal offered by [facility] by registering at the following website: [email or web address]. By joining [employer]’s FollowMyHealth portal, you will also be able to view your health information using other applications (apps) compatible with our system.

## 2022-08-12 NOTE — ED PROVIDER NOTE - PHYSICAL EXAMINATION
Gen: Alert, NAD  Head/eyes: NC/AT, PERRL  ENT: airway patent  Neck: supple  Pulm/lung: Bilateral clear BS  CV/heart: RRR  GI/Abd: soft, NT/ND, +BS, no guarding/rebound tenderness  Musculoskeletal: no edema/erythema/cyanosis  Skin: no rash  Neuro: AAOx3, grossly intact, NIHSS = 0

## 2022-08-12 NOTE — ED PROVIDER NOTE - PROGRESS NOTE DETAILS
feels much better, back to baseline, neuro/motor intact, will f/u with his cardiologist Dr. Orozco and outpt neuro

## 2022-10-10 ENCOUNTER — RX RENEWAL (OUTPATIENT)
Age: 72
End: 2022-10-10

## 2022-10-10 NOTE — ED ADULT NURSE NOTE - DATE OF SECOND COVID-19 BOOSTER
[FreeTextEntry1] : 58 Ranjeet Perales MD. 
12-Jun-2022

## 2022-10-11 ENCOUNTER — TRANSCRIPTION ENCOUNTER (OUTPATIENT)
Age: 72
End: 2022-10-11

## 2022-10-11 ENCOUNTER — APPOINTMENT (OUTPATIENT)
Dept: PULMONOLOGY | Facility: CLINIC | Age: 72
End: 2022-10-11

## 2022-10-11 VITALS — HEART RATE: 55 BPM | OXYGEN SATURATION: 97 %

## 2022-10-11 VITALS — DIASTOLIC BLOOD PRESSURE: 58 MMHG | SYSTOLIC BLOOD PRESSURE: 144 MMHG

## 2022-10-11 PROCEDURE — 99213 OFFICE O/P EST LOW 20 MIN: CPT

## 2022-10-11 NOTE — PHYSICAL EXAM
[Normal S1, S2] : normal s1, s2 [General Appearance - Well Developed] : well developed [General Appearance - Well Nourished] : well nourished [Heart Sounds] : normal S1 and S2 [Auscultation Breath Sounds / Voice Sounds] : lungs were clear to auscultation bilaterally [Lungs Percussion] : the lungs were normal to percussion [Abdomen Soft] : soft [Abdomen Tenderness] : non-tender [Nail Clubbing] : no clubbing of the fingernails [Cyanosis, Localized] : no localized cyanosis [Petechial Hemorrhages (___cm)] : no petechial hemorrhages [] : no ischemic changes [Clear to Auscultation Bilaterally] : clear to auscultation bilaterally

## 2022-10-11 NOTE — ASSESSMENT
[FreeTextEntry1] : RVP\par Continue symbicort 80\par F/U 1 month PFT and Flu vaccine.\par Continue exercise program\par

## 2022-10-11 NOTE — HISTORY OF PRESENT ILLNESS
[TextBox_4] : Got COVID booster Friday\par the next day started with mucus in throat clear\par no wheeze\par clearing throat\par \par on Symbicort 80 2 puffs bid. Prior to shot was doing well\par used albuterol increase use this weekend\par \par uses Allegra seasonally \par \par  [TextBox_11] : .5 [TextBox_13] : 8 [YearQuit] : 1980

## 2022-10-12 LAB
RAPID RVP RESULT: DETECTED
RV+EV RNA SPEC QL NAA+PROBE: DETECTED
SARS-COV-2 RNA PNL RESP NAA+PROBE: NOT DETECTED

## 2022-11-15 ENCOUNTER — APPOINTMENT (OUTPATIENT)
Dept: PULMONOLOGY | Facility: CLINIC | Age: 72
End: 2022-11-15

## 2022-11-15 VITALS — SYSTOLIC BLOOD PRESSURE: 126 MMHG | OXYGEN SATURATION: 99 % | HEART RATE: 50 BPM | DIASTOLIC BLOOD PRESSURE: 61 MMHG

## 2022-11-15 DIAGNOSIS — Z23 ENCOUNTER FOR IMMUNIZATION: ICD-10-CM

## 2022-11-15 LAB — POCT - HEMOGLOBIN (HGB), QUANTITATIVE, TRANSCUTANEOUS: 14.1

## 2022-11-15 PROCEDURE — 94729 DIFFUSING CAPACITY: CPT

## 2022-11-15 PROCEDURE — 90662 IIV NO PRSV INCREASED AG IM: CPT

## 2022-11-15 PROCEDURE — G0008: CPT

## 2022-11-15 PROCEDURE — ZZZZZ: CPT

## 2022-11-15 PROCEDURE — 94727 GAS DIL/WSHOT DETER LNG VOL: CPT

## 2022-11-15 PROCEDURE — 88738 HGB QUANT TRANSCUTANEOUS: CPT

## 2022-11-15 PROCEDURE — 94010 BREATHING CAPACITY TEST: CPT

## 2022-11-15 PROCEDURE — 99213 OFFICE O/P EST LOW 20 MIN: CPT | Mod: 25

## 2022-11-15 NOTE — PHYSICAL EXAM
[Normal S1, S2] : normal s1, s2 [Clear to Auscultation Bilaterally] : clear to auscultation bilaterally [General Appearance - Well Developed] : well developed [General Appearance - Well Nourished] : well nourished [Heart Sounds] : normal S1 and S2 [Auscultation Breath Sounds / Voice Sounds] : lungs were clear to auscultation bilaterally [Lungs Percussion] : the lungs were normal to percussion [Abdomen Soft] : soft [Abdomen Tenderness] : non-tender [Nail Clubbing] : no clubbing of the fingernails [Cyanosis, Localized] : no localized cyanosis [Petechial Hemorrhages (___cm)] : no petechial hemorrhages [] : no ischemic changes

## 2022-11-16 NOTE — HISTORY OF PRESENT ILLNESS
[TextBox_4] : \par \par on Symbicort 80 2 puffs bid.  doing well\par using albuterol about once every other day  for throat congestion and slight tightness\par had positive entero/rhinoviruses in October\par \par uses Allegra seasonally \par \par  [TextBox_11] : .5 [TextBox_13] : 8 [YearQuit] : 1980

## 2022-11-16 NOTE — ASSESSMENT
[FreeTextEntry1] : \par Continue Symbicort 80\par flu shot given\par r/t 6 months f/u\par Continue exercise program\par

## 2022-11-16 NOTE — PROCEDURE
none [FreeTextEntry1] : 11/16/2022\par Pulmonary function testing\par These data demonstrate a mild obstructive ventilatory deficit. There is elevation in the RV/TLC ratio indicative of possible air trapping. There is a mild-mod diffusion impairment \par \par PFT attached relatively stable function.\par \par \par

## 2022-11-16 NOTE — DISCUSSION/SUMMARY
[FreeTextEntry1] : Asthma meets goals. Denies significant nocturnal symptoms. Rare beta agonist use. Denies significant cough, wheezing, SOB.\par Bronchiectasis without recent exacerbations. \par Component of mild COPD.\par S/P upper respiratory tract infection without significant decompensation.

## 2022-12-07 ENCOUNTER — RX RENEWAL (OUTPATIENT)
Age: 72
End: 2022-12-07

## 2022-12-13 ENCOUNTER — APPOINTMENT (OUTPATIENT)
Dept: PULMONOLOGY | Facility: CLINIC | Age: 72
End: 2022-12-13

## 2022-12-13 PROCEDURE — 99214 OFFICE O/P EST MOD 30 MIN: CPT | Mod: CS,95

## 2022-12-13 RX ORDER — NIRMATRELVIR AND RITONAVIR 300-100 MG
20 X 150 MG & KIT ORAL
Qty: 1 | Refills: 0 | Status: ACTIVE | COMMUNITY
Start: 2022-12-13 | End: 1900-01-01

## 2022-12-13 NOTE — HISTORY OF PRESENT ILLNESS
[TextBox_4] : This visit was provided via telehealth using real-time 2-way audio visual technology. The patient,  ANTHONY PEARCE , was located at home, [address] [facility]\[place] [address] at the time of the visit. \par The provider, Anahi Lujan, was located at office at the time of the visit. \par The patient, Mr. ANTHONY PEARCE and Physician Anahi Ferraro participated in the telehealth encounter. \par Verbal consent obtained by  from patient \par \par ANTHONY PEARCE is a 72 year old male who presents with covid 19 infection\par PMH: bronchiectasis on symbicort,  HTN, former smoker\par \par tested postive for covid yesterday- home test\par 48 hours ago felt symptoms started: cough, 100.1F, hoarse voice. aches/pains\par \par vaccinated & Boosted\par \par at rest pulse ox is 98%\par no d yspnea\par \par  # Packs per day: .5 \par # Years: 8 \par Year quit: 1980 \par \par \par meds: losartan, Lipitor, albuterol, Flonase, symbicort, Cardizem, chlorthalidone , cialias, xanax\par

## 2022-12-13 NOTE — ASSESSMENT
[FreeTextEntry1] : check pulse ox , maintain about >92%\par paxlovid\par stop lipitor for 1 week\par interaction with some of his antihypertensive meds\par stop xanax\par covid bundle\par start aprin 81\par continue inhalers\par albuterol prn\par OTC meds for aches/pain\par

## 2022-12-15 ENCOUNTER — APPOINTMENT (OUTPATIENT)
Dept: AFTER HOURS CARE | Facility: EMERGENCY ROOM | Age: 72
End: 2022-12-15

## 2022-12-15 ENCOUNTER — APPOINTMENT (OUTPATIENT)
Dept: PULMONOLOGY | Facility: CLINIC | Age: 72
End: 2022-12-15

## 2022-12-15 PROCEDURE — 99214 OFFICE O/P EST MOD 30 MIN: CPT | Mod: CS,95

## 2022-12-15 NOTE — PHYSICAL EXAM
[No Acute Distress] : no acute distress [Well Nourished] : well nourished [No Resp Distress] : no resp distress [No Acc Muscle Use] : no acc muscle use [Oriented x3] : oriented x3

## 2022-12-15 NOTE — ASSESSMENT
[FreeTextEntry1] : labs reviewed\par dimer < 150\par mildly high procal\par continue paxlvoid\par start back antihypertensives by this weekend (would be on day 4 & % of paxlvoid)\par keep holding lipitor for another week\par restart bronchodilators\par mucinex PRN\par tessalon perles\par cephacol \par if pulse ox < 92% and stays that low he needs to call us and go to ER\par

## 2022-12-15 NOTE — HISTORY OF PRESENT ILLNESS
[TextBox_4] : This visit was provided via telehealth using real-time 2-way audio visual technology. The patient,  ANTHONY PEARCE , was located at home, [address] [facility]\[place] [address] at the time of the visit. \par The provider, Anahi Lujan, was located at office at the time of the visit. \par The patient, Mr. ANTHONY PEARCE and Physician Anahi Ferraro participated in the telehealth encounter. \par Verbal consent obtained by  from patient \par \par ANTHONY PEARCE is a 72 year old male who presents for covid f/u\par he had labs drawn yesterday\par he started paxlvoid yesterday\par \par today- pulse ox 92-97%\par if its 92% it quickly comes up to >95% w/o any movement\par he does not have wheezing, no dyspnea\par he had stopped lipitor, antihypertensives but also stopped his bronchodilators\par sore throat +\par chest congestion +\par nasal congestion +\par

## 2022-12-16 ENCOUNTER — APPOINTMENT (OUTPATIENT)
Dept: PULMONOLOGY | Facility: CLINIC | Age: 72
End: 2022-12-16

## 2022-12-16 DIAGNOSIS — U07.1 COVID-19: ICD-10-CM

## 2022-12-16 PROCEDURE — 99204 OFFICE O/P NEW MOD 45 MIN: CPT | Mod: CS,95

## 2022-12-16 NOTE — PLAN
[With new medications prescribed] : Treat in place: with new medications prescribed [FreeTextEntry1] : continue symbicort, albuterol and paxlovid\par rx prednisone\par ED precautions\par anticipatory guidance\par pulm vs pmd f/u\par close sx monitoring

## 2022-12-16 NOTE — HISTORY OF PRESENT ILLNESS
[Home] : at home, [unfilled] , at the time of the visit. [Other Location: e.g. Home (Enter Location, City,State)___] : at [unfilled] [Verbal consent obtained from patient] : the patient, [unfilled] [FreeTextEntry8] : 72y M hx asthma on symbicort and albuterol, d'x w/COVID 4d, on day 3 of paxlovid now p/w bad URI sx, sore throat,\par congestion, cough, incr whz. SpO2 95%.

## 2022-12-19 ENCOUNTER — APPOINTMENT (OUTPATIENT)
Dept: AFTER HOURS CARE | Facility: EMERGENCY ROOM | Age: 72
End: 2022-12-19

## 2022-12-19 PROCEDURE — 99214 OFFICE O/P EST MOD 30 MIN: CPT | Mod: 95

## 2022-12-19 NOTE — PLAN
[With new medications prescribed] : Treat in place: with new medications prescribed [FreeTextEntry1] : rx augmentin, followed by doxy\par close sx monitoring\par prompt PMD f/u\par strict ED precautions\par lots of anticipatory guidance

## 2022-12-19 NOTE — HISTORY OF PRESENT ILLNESS
[Home] : at home, [unfilled] , at the time of the visit. [Other Location: e.g. Home (Enter Location, City,State)___] : at [unfilled] [Verbal consent obtained from patient] : the patient, [unfilled] [FreeTextEntry8] : 72y M hx HTN, asthma and bronchiectasis, seen by me 3d ago after he tested positive 1wk ago for COVID, already\par done with PAXLOVID, on day 4 of prednisone. No longer and fever or malaise, SpO2 is normal. Pt is still congested\par and coughing up green phlegm. No fever.

## 2023-03-13 ENCOUNTER — RX RENEWAL (OUTPATIENT)
Age: 73
End: 2023-03-13

## 2023-04-27 ENCOUNTER — NON-APPOINTMENT (OUTPATIENT)
Age: 73
End: 2023-04-27

## 2023-05-02 NOTE — DISCUSSION/SUMMARY
[FreeTextEntry1] : Asthma meets goals. Denies significant nocturnal symptoms. Rare beta agonist use. Denies significant cough, wheezing, SOB.\par Bronchiectasis without recent exacerbations. \par Component of mild COPD.\par Possible upper respiratory tract infection. No

## 2023-05-16 ENCOUNTER — APPOINTMENT (OUTPATIENT)
Dept: PULMONOLOGY | Facility: CLINIC | Age: 73
End: 2023-05-16
Payer: MEDICARE

## 2023-05-16 VITALS — SYSTOLIC BLOOD PRESSURE: 119 MMHG | OXYGEN SATURATION: 98 % | HEART RATE: 60 BPM | DIASTOLIC BLOOD PRESSURE: 63 MMHG

## 2023-05-16 LAB — POCT - HEMOGLOBIN (HGB), QUANTITATIVE, TRANSCUTANEOUS: 11.8

## 2023-05-16 PROCEDURE — ZZZZZ: CPT

## 2023-05-16 PROCEDURE — 94010 BREATHING CAPACITY TEST: CPT

## 2023-05-16 PROCEDURE — 90677 PCV20 VACCINE IM: CPT

## 2023-05-16 PROCEDURE — 88738 HGB QUANT TRANSCUTANEOUS: CPT

## 2023-05-16 PROCEDURE — 94729 DIFFUSING CAPACITY: CPT

## 2023-05-16 PROCEDURE — 99214 OFFICE O/P EST MOD 30 MIN: CPT | Mod: 25

## 2023-05-16 PROCEDURE — 94727 GAS DIL/WSHOT DETER LNG VOL: CPT

## 2023-05-16 PROCEDURE — 95012 NITRIC OXIDE EXP GAS DETER: CPT

## 2023-05-16 PROCEDURE — G0009: CPT

## 2023-05-16 NOTE — ASSESSMENT
[FreeTextEntry1] : Prevnar 20 given\par Continue Symbicort 80 can use PRN.  Discussed parameters.\par r/t 6 months f/u\par Continue exercise program\par \par \par 35 minutes spent in evaluation management and review of studies.\par \par

## 2023-05-16 NOTE — PROCEDURE
[FreeTextEntry1] : 05/16/2023\par Pulmonary function testing\par These data demonstrate a mild obstructive ventilatory deficit. Normal Lung Volumes. There is a mild diffusion impairment. Corrects to normal with lung volume correction \par PFT attached relatively stable function.\par \par \par

## 2023-05-16 NOTE — HISTORY OF PRESENT ILLNESS
[TextBox_4] : did have COVID in December took Paxlovid, lasted 7 days, did have some issues with breathing for a short period because \par was off inhalers, took prednisone with help while on paxlovid\par \par on Symbicort 80 2 puffs bid.  doing well\par rare albuterol\par uses Allegra seasonally \par \par asking if needs Symbicort\par  did stop it for 2 weeks while in [place] in Feb\par Hx social smoking stopped at age 29\par \par neuro recently to start Neurontin 300 mg bid for sciatic pain, asking if it is OK with his breathing\par \par Not sure if had pneumonia shot.

## 2023-05-16 NOTE — DISCUSSION/SUMMARY
[FreeTextEntry1] : Asthma meets goals. Denies significant nocturnal symptoms. Rare beta agonist use. Denies significant cough, wheezing, SOB.  No significant increase in symptoms off of regular bronchodilator therapy.\par Bronchiectasis without recent exacerbations. \par Component of mild COPD.  Stable.\par S/P COVID without significant decompensation.\par former smoker stopped age 29 10 year cheng

## 2023-05-31 ENCOUNTER — NON-APPOINTMENT (OUTPATIENT)
Age: 73
End: 2023-05-31

## 2023-06-15 ENCOUNTER — RX RENEWAL (OUTPATIENT)
Age: 73
End: 2023-06-15

## 2023-07-12 ENCOUNTER — RX RENEWAL (OUTPATIENT)
Age: 73
End: 2023-07-12

## 2023-07-16 ENCOUNTER — EMERGENCY (EMERGENCY)
Facility: HOSPITAL | Age: 73
LOS: 1 days | Discharge: HOME | End: 2023-07-16
Attending: EMERGENCY MEDICINE | Admitting: EMERGENCY MEDICINE
Payer: MEDICARE

## 2023-07-16 VITALS
RESPIRATION RATE: 15 BRPM | TEMPERATURE: 98 F | HEIGHT: 69 IN | SYSTOLIC BLOOD PRESSURE: 99 MMHG | HEART RATE: 76 BPM | WEIGHT: 139.99 LBS | OXYGEN SATURATION: 98 % | DIASTOLIC BLOOD PRESSURE: 72 MMHG

## 2023-07-16 DIAGNOSIS — Z98.89 OTHER SPECIFIED POSTPROCEDURAL STATES: Chronic | ICD-10-CM

## 2023-07-16 LAB
ALBUMIN SERPL ELPH-MCNC: 3.8 G/DL — SIGNIFICANT CHANGE UP (ref 3.3–5)
ALP SERPL-CCNC: 87 U/L — SIGNIFICANT CHANGE UP (ref 30–120)
ALT FLD-CCNC: 51 U/L DA — SIGNIFICANT CHANGE UP (ref 10–60)
ANION GAP SERPL CALC-SCNC: 6 MMOL/L — SIGNIFICANT CHANGE UP (ref 5–17)
APTT BLD: 26.2 SEC — LOW (ref 27.5–35.5)
AST SERPL-CCNC: 24 U/L — SIGNIFICANT CHANGE UP (ref 10–40)
BASOPHILS # BLD AUTO: 0.03 K/UL — SIGNIFICANT CHANGE UP (ref 0–0.2)
BASOPHILS NFR BLD AUTO: 0.4 % — SIGNIFICANT CHANGE UP (ref 0–2)
BILIRUB SERPL-MCNC: 0.4 MG/DL — SIGNIFICANT CHANGE UP (ref 0.2–1.2)
BUN SERPL-MCNC: 33 MG/DL — HIGH (ref 7–23)
CALCIUM SERPL-MCNC: 9.8 MG/DL — SIGNIFICANT CHANGE UP (ref 8.4–10.5)
CHLORIDE SERPL-SCNC: 103 MMOL/L — SIGNIFICANT CHANGE UP (ref 96–108)
CO2 SERPL-SCNC: 31 MMOL/L — SIGNIFICANT CHANGE UP (ref 22–31)
CREAT SERPL-MCNC: 1.23 MG/DL — SIGNIFICANT CHANGE UP (ref 0.5–1.3)
D DIMER BLD IA.RAPID-MCNC: <150 NG/ML DDU — SIGNIFICANT CHANGE UP
EGFR: 62 ML/MIN/1.73M2 — SIGNIFICANT CHANGE UP
EOSINOPHIL # BLD AUTO: 0.05 K/UL — SIGNIFICANT CHANGE UP (ref 0–0.5)
EOSINOPHIL NFR BLD AUTO: 0.7 % — SIGNIFICANT CHANGE UP (ref 0–6)
GLUCOSE SERPL-MCNC: 152 MG/DL — HIGH (ref 70–99)
HCT VFR BLD CALC: 41.7 % — SIGNIFICANT CHANGE UP (ref 39–50)
HGB BLD-MCNC: 14.1 G/DL — SIGNIFICANT CHANGE UP (ref 13–17)
IMM GRANULOCYTES NFR BLD AUTO: 0.3 % — SIGNIFICANT CHANGE UP (ref 0–0.9)
INR BLD: 0.97 RATIO — SIGNIFICANT CHANGE UP (ref 0.88–1.16)
LYMPHOCYTES # BLD AUTO: 1.33 K/UL — SIGNIFICANT CHANGE UP (ref 1–3.3)
LYMPHOCYTES # BLD AUTO: 18.2 % — SIGNIFICANT CHANGE UP (ref 13–44)
MCHC RBC-ENTMCNC: 30.7 PG — SIGNIFICANT CHANGE UP (ref 27–34)
MCHC RBC-ENTMCNC: 33.8 GM/DL — SIGNIFICANT CHANGE UP (ref 32–36)
MCV RBC AUTO: 90.7 FL — SIGNIFICANT CHANGE UP (ref 80–100)
MONOCYTES # BLD AUTO: 0.6 K/UL — SIGNIFICANT CHANGE UP (ref 0–0.9)
MONOCYTES NFR BLD AUTO: 8.2 % — SIGNIFICANT CHANGE UP (ref 2–14)
NEUTROPHILS # BLD AUTO: 5.28 K/UL — SIGNIFICANT CHANGE UP (ref 1.8–7.4)
NEUTROPHILS NFR BLD AUTO: 72.2 % — SIGNIFICANT CHANGE UP (ref 43–77)
NRBC # BLD: 0 /100 WBCS — SIGNIFICANT CHANGE UP (ref 0–0)
NT-PROBNP SERPL-SCNC: 57 PG/ML — SIGNIFICANT CHANGE UP (ref 0–125)
PLATELET # BLD AUTO: 217 K/UL — SIGNIFICANT CHANGE UP (ref 150–400)
POTASSIUM SERPL-MCNC: 3.5 MMOL/L — SIGNIFICANT CHANGE UP (ref 3.5–5.3)
POTASSIUM SERPL-SCNC: 3.5 MMOL/L — SIGNIFICANT CHANGE UP (ref 3.5–5.3)
PROT SERPL-MCNC: 7.1 G/DL — SIGNIFICANT CHANGE UP (ref 6–8.3)
PROTHROM AB SERPL-ACNC: 11.5 SEC — SIGNIFICANT CHANGE UP (ref 10.5–13.4)
RBC # BLD: 4.6 M/UL — SIGNIFICANT CHANGE UP (ref 4.2–5.8)
RBC # FLD: 12 % — SIGNIFICANT CHANGE UP (ref 10.3–14.5)
SODIUM SERPL-SCNC: 140 MMOL/L — SIGNIFICANT CHANGE UP (ref 135–145)
TROPONIN I, HIGH SENSITIVITY RESULT: 10.6 NG/L — SIGNIFICANT CHANGE UP
WBC # BLD: 7.31 K/UL — SIGNIFICANT CHANGE UP (ref 3.8–10.5)
WBC # FLD AUTO: 7.31 K/UL — SIGNIFICANT CHANGE UP (ref 3.8–10.5)

## 2023-07-16 PROCEDURE — 99285 EMERGENCY DEPT VISIT HI MDM: CPT | Mod: 25

## 2023-07-16 PROCEDURE — 93010 ELECTROCARDIOGRAM REPORT: CPT

## 2023-07-16 PROCEDURE — 71045 X-RAY EXAM CHEST 1 VIEW: CPT

## 2023-07-16 PROCEDURE — 83880 ASSAY OF NATRIURETIC PEPTIDE: CPT

## 2023-07-16 PROCEDURE — 85379 FIBRIN DEGRADATION QUANT: CPT

## 2023-07-16 PROCEDURE — 71045 X-RAY EXAM CHEST 1 VIEW: CPT | Mod: 26

## 2023-07-16 PROCEDURE — 36415 COLL VENOUS BLD VENIPUNCTURE: CPT

## 2023-07-16 PROCEDURE — 84484 ASSAY OF TROPONIN QUANT: CPT

## 2023-07-16 PROCEDURE — 85025 COMPLETE CBC W/AUTO DIFF WBC: CPT

## 2023-07-16 PROCEDURE — 99285 EMERGENCY DEPT VISIT HI MDM: CPT

## 2023-07-16 PROCEDURE — 85610 PROTHROMBIN TIME: CPT

## 2023-07-16 PROCEDURE — 85730 THROMBOPLASTIN TIME PARTIAL: CPT

## 2023-07-16 PROCEDURE — 93005 ELECTROCARDIOGRAM TRACING: CPT

## 2023-07-16 PROCEDURE — 80053 COMPREHEN METABOLIC PANEL: CPT

## 2023-07-16 NOTE — ED ADULT TRIAGE NOTE - CHIEF COMPLAINT QUOTE
I have sob since yesterday and left foot swollen,   I started new medication and change doses recently.

## 2023-07-16 NOTE — ED ADULT NURSE NOTE - NSFALLUNIVINTERV_ED_ALL_ED
Bed/Stretcher in lowest position, wheels locked, appropriate side rails in place/Call bell, personal items and telephone in reach/Instruct patient to call for assistance before getting out of bed/chair/stretcher/Non-slip footwear applied when patient is off stretcher/[place] to call system/Physically safe environment - no spills, clutter or unnecessary equipment/Purposeful proactive rounding/Room/bathroom lighting operational, light cord in reach

## 2023-07-16 NOTE — ED PROVIDER NOTE - CLINICAL SUMMARY MEDICAL DECISION MAKING FREE TEXT BOX
72-year-old male with history of anxiety asthma hypertension complaining of shortness of breath and not feeling well today.  States he recently changed his antihypertensives due to leg swelling from Cardizem.  Now taking lisinopril instead.  Had outpatient venous Doppler of legs last week which revealed no abnormality.  Denies fever cough chest pain nausea vomiting diarrhea dysuria hematuria melena or other symptom.  Took Xanax today with some improvement.    Physical exam is unrevealing.  Impression is dyspnea unclear etiology could be related to new blood pressure med versus asthma versus anxiety.  Plan is EKG chest x-ray labs and reassess.

## 2023-07-16 NOTE — ED ADULT NURSE NOTE - OBJECTIVE STATEMENT
71 y/o male received axo4 ambulatory c/o shortness of breath and dyspnea on exertion x 2 days. pt states that his medication dosages were recently changed and has begun experiencing intermittent SOB since.

## 2023-07-16 NOTE — ED PROVIDER NOTE - CHPI ED SYMPTOMS NEG
no body aches/no chest pain/no cough/no edema/no fever/no headache/no hemoptysis/no wheezing/no chills/no diaphoresis

## 2023-07-16 NOTE — ED PROVIDER NOTE - OBJECTIVE STATEMENT
72-year-old male with history of anxiety asthma hypertension complaining of shortness of breath and not feeling well today.  States he recently changed his antihypertensives due to leg swelling from Cardizem.  Now taking lisinopril instead.  Had outpatient venous Doppler of legs last week which revealed no abnormality.  Denies fever cough chest pain nausea vomiting diarrhea dysuria hematuria melena or other symptom.  Took Xanax today with some improvement.

## 2023-07-16 NOTE — ED PROVIDER NOTE - NSFOLLOWUPINSTRUCTIONS_ED_ALL_ED_FT
Shortness of Breath, Adult  Shortness of breath means you have trouble breathing. Shortness of breath could be a sign of a medical problem.    Follow these instructions at home:  A sign showing that a person should not smoke.  Pollution    Do not smoke or use any products that contain nicotine or tobacco. If you need help quitting, ask your doctor.  Avoid things that can make it harder to breathe, such as:  Smoke of all kinds. This includes smoke from campfires or forest fires. Do not smoke or allow others to smoke in your home.  Mold.  Dust.  Air pollution.  Chemical smells.  Things that can give you an allergic reaction (allergens) if you have allergies.  Keep your living space clean. Use products that help remove mold and dust.  General instructions    Watch for any changes in your symptoms.  Take over-the-counter and prescription medicines only as told by your doctor. This includes oxygen therapy and inhaled medicines.  Rest as needed.  Return to your normal activities when your doctor says that it is safe.  Keep all follow-up visits.  Contact a doctor if:  Your condition does not get better as soon as expected.  You have a hard time doing your normal activities, even after you rest.  You have new symptoms.  You cannot walk up stairs.  You cannot exercise the way you normally do.  Get help right away if:  Your shortness of breath gets worse.  You have trouble breathing when you are resting.  You feel light-headed or you faint.  You have a cough that is not helped by medicines.  You cough up blood.  You have pain with breathing.  You have pain in your chest, arms, shoulders, or belly (abdomen).  You have a fever.  These symptoms may be an emergency. Get help right away. Call 911.  Do not wait to see if the symptoms will go away.  Do not drive yourself to the hospital.  Summary  Shortness of breath is when you have trouble breathing enough air. It can be a sign of a medical problem.  Avoid things that make it hard for you to breathe, such as smoking, pollution, mold, and dust.  Watch for any changes in your symptoms. Contact your doctor if you do not get better or you get worse.  This information is not intended to replace advice given to you by your health care provider. Make sure you discuss any questions you have with your health care provider.

## 2023-07-16 NOTE — ED PROVIDER NOTE - PATIENT PORTAL LINK FT
You can access the FollowMyHealth Patient Portal offered by [facility] by registering at the following website: [email or web address]. By joining [facility]’s FollowMyHealth portal, you will also be able to view your health information using other applications (apps) compatible with our system.

## 2023-07-16 NOTE — ED PROVIDER NOTE - MUSCULOSKELETAL, MLM
Spine appears normal, range of motion is not limited, no muscle or joint tenderness. There is no edema.

## 2023-08-03 ENCOUNTER — RX RENEWAL (OUTPATIENT)
Age: 73
End: 2023-08-03

## 2023-08-03 RX ORDER — ALBUTEROL SULFATE 90 UG/1
108 (90 BASE) INHALANT RESPIRATORY (INHALATION)
Qty: 1 | Refills: 2 | Status: ACTIVE | COMMUNITY
Start: 2020-05-21 | End: 1900-01-01

## 2023-11-14 ENCOUNTER — APPOINTMENT (OUTPATIENT)
Dept: PULMONOLOGY | Facility: CLINIC | Age: 73
End: 2023-11-14
Payer: MEDICARE

## 2023-11-14 VITALS — SYSTOLIC BLOOD PRESSURE: 133 MMHG | OXYGEN SATURATION: 98 % | DIASTOLIC BLOOD PRESSURE: 62 MMHG | HEART RATE: 68 BPM

## 2023-11-14 LAB — POCT - HEMOGLOBIN (HGB), QUANTITATIVE, TRANSCUTANEOUS: 12.4

## 2023-11-14 PROCEDURE — 88738 HGB QUANT TRANSCUTANEOUS: CPT

## 2023-11-14 PROCEDURE — 94010 BREATHING CAPACITY TEST: CPT

## 2023-11-14 PROCEDURE — ZZZZZ: CPT

## 2023-11-14 PROCEDURE — 99213 OFFICE O/P EST LOW 20 MIN: CPT | Mod: 25

## 2023-11-14 PROCEDURE — 94729 DIFFUSING CAPACITY: CPT

## 2023-11-14 PROCEDURE — 94727 GAS DIL/WSHOT DETER LNG VOL: CPT

## 2023-11-14 PROCEDURE — 95012 NITRIC OXIDE EXP GAS DETER: CPT

## 2023-11-14 RX ORDER — BENZONATATE 200 MG/1
200 CAPSULE ORAL
Qty: 90 | Refills: 0 | Status: DISCONTINUED | COMMUNITY
Start: 2022-12-15 | End: 2023-11-14

## 2023-11-14 RX ORDER — DOXYCYCLINE 100 MG/1
100 CAPSULE ORAL TWICE DAILY
Qty: 14 | Refills: 0 | Status: DISCONTINUED | COMMUNITY
Start: 2022-12-19 | End: 2023-11-14

## 2023-11-14 RX ORDER — DOXYCYCLINE 100 MG/1
100 CAPSULE ORAL TWICE DAILY
Qty: 6 | Refills: 0 | Status: DISCONTINUED | COMMUNITY
Start: 2022-12-19 | End: 2023-11-14

## 2023-11-14 RX ORDER — MONTELUKAST 10 MG/1
10 TABLET, FILM COATED ORAL
Qty: 90 | Refills: 3 | Status: ACTIVE | COMMUNITY
Start: 2023-11-14 | End: 1900-01-01

## 2023-11-14 RX ORDER — PREDNISONE 20 MG/1
20 TABLET ORAL
Qty: 14 | Refills: 0 | Status: DISCONTINUED | COMMUNITY
Start: 2022-12-16 | End: 2023-11-14

## 2023-11-14 RX ORDER — AMOXICILLIN AND CLAVULANATE POTASSIUM 875; 125 MG/1; MG/1
875-125 TABLET, COATED ORAL TWICE DAILY
Qty: 20 | Refills: 0 | Status: DISCONTINUED | COMMUNITY
Start: 2022-12-19 | End: 2023-11-14

## 2024-01-23 RX ORDER — BUDESONIDE AND FORMOTEROL FUMARATE DIHYDRATE 80; 4.5 UG/1; UG/1
80-4.5 AEROSOL RESPIRATORY (INHALATION) TWICE DAILY
Qty: 1 | Refills: 5 | Status: DISCONTINUED | COMMUNITY
Start: 2021-04-16 | End: 2024-01-23

## 2024-01-23 RX ORDER — BUDESONIDE AND FORMOTEROL FUMARATE DIHYDRATE 160; 4.5 UG/1; UG/1
160-4.5 AEROSOL RESPIRATORY (INHALATION)
Qty: 1 | Refills: 6 | Status: DISCONTINUED | COMMUNITY
Start: 2023-03-13 | End: 2024-01-23

## 2024-01-23 RX ORDER — BUDESONIDE AND FORMOTEROL FUMARATE DIHYDRATE 160; 4.5 UG/1; UG/1
160-4.5 AEROSOL RESPIRATORY (INHALATION)
Qty: 1 | Refills: 5 | Status: DISCONTINUED | COMMUNITY
Start: 2018-07-23 | End: 2024-01-23

## 2024-01-29 RX ORDER — BUDESONIDE AND FORMOTEROL FUMARATE DIHYDRATE 80; 4.5 UG/1; UG/1
80-4.5 AEROSOL RESPIRATORY (INHALATION) TWICE DAILY
Qty: 1 | Refills: 0 | Status: DISCONTINUED | COMMUNITY
Start: 2023-06-15 | End: 2024-01-29

## 2024-01-29 RX ORDER — MOMETASONE FUROATE AND FORMOTEROL FUMARATE DIHYDRATE 100; 5 UG/1; UG/1
100-5 AEROSOL RESPIRATORY (INHALATION)
Qty: 1 | Refills: 5 | Status: DISCONTINUED | COMMUNITY
Start: 2024-01-23 | End: 2024-01-29

## 2024-03-07 RX ORDER — BUDESONIDE AND FORMOTEROL FUMARATE DIHYDRATE 160; 4.5 UG/1; UG/1
160-4.5 AEROSOL RESPIRATORY (INHALATION)
Qty: 3 | Refills: 3 | Status: ACTIVE | COMMUNITY
Start: 2024-03-07 | End: 1900-01-01

## 2024-03-07 RX ORDER — ALBUTEROL SULFATE 90 UG/1
108 (90 BASE) INHALANT RESPIRATORY (INHALATION)
Qty: 1 | Refills: 5 | Status: ACTIVE | COMMUNITY
Start: 2018-10-08 | End: 1900-01-01

## 2024-03-07 RX ORDER — FLUTICASONE PROPIONATE AND SALMETEROL 100; 50 UG/1; UG/1
100-50 POWDER RESPIRATORY (INHALATION) TWICE DAILY
Qty: 1 | Refills: 5 | Status: DISCONTINUED | COMMUNITY
Start: 2024-01-29 | End: 2024-03-07

## 2024-03-11 RX ORDER — FLUTICASONE PROPIONATE AND SALMETEROL XINAFOATE 230; 21 UG/1; UG/1
230-21 AEROSOL, METERED RESPIRATORY (INHALATION)
Qty: 1 | Refills: 5 | Status: ACTIVE | COMMUNITY
Start: 2024-03-11 | End: 1900-01-01

## 2024-04-01 ENCOUNTER — NON-APPOINTMENT (OUTPATIENT)
Age: 74
End: 2024-04-01

## 2024-04-17 ENCOUNTER — APPOINTMENT (OUTPATIENT)
Dept: ORTHOPEDIC SURGERY | Facility: CLINIC | Age: 74
End: 2024-04-17
Payer: MEDICARE

## 2024-04-17 VITALS — WEIGHT: 144 LBS | HEIGHT: 68.5 IN | BODY MASS INDEX: 21.57 KG/M2

## 2024-04-17 DIAGNOSIS — Z86.39 PERSONAL HISTORY OF OTHER ENDOCRINE, NUTRITIONAL AND METABOLIC DISEASE: ICD-10-CM

## 2024-04-17 DIAGNOSIS — Z86.79 PERSONAL HISTORY OF OTHER DISEASES OF THE CIRCULATORY SYSTEM: ICD-10-CM

## 2024-04-17 DIAGNOSIS — M51.36 OTHER INTERVERTEBRAL DISC DEGENERATION, LUMBAR REGION: ICD-10-CM

## 2024-04-17 DIAGNOSIS — M47.812 SPONDYLOSIS W/OUT MYELOPATHY OR RADICULOPATHY, CERVICAL REGION: ICD-10-CM

## 2024-04-17 DIAGNOSIS — M47.816 SPONDYLOSIS W/OUT MYELOPATHY OR RADICULOPATHY, LUMBAR REGION: ICD-10-CM

## 2024-04-17 DIAGNOSIS — Z87.09 PERSONAL HISTORY OF OTHER DISEASES OF THE RESPIRATORY SYSTEM: ICD-10-CM

## 2024-04-17 DIAGNOSIS — M54.2 CERVICALGIA: ICD-10-CM

## 2024-04-17 PROCEDURE — 99204 OFFICE O/P NEW MOD 45 MIN: CPT

## 2024-04-17 PROCEDURE — 72070 X-RAY EXAM THORAC SPINE 2VWS: CPT

## 2024-04-17 PROCEDURE — 72050 X-RAY EXAM NECK SPINE 4/5VWS: CPT

## 2024-04-17 NOTE — DISCUSSION/SUMMARY
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

## 2024-04-17 NOTE — HISTORY OF PRESENT ILLNESS
[4] : 4 [Sharp] : sharp [Intermittent] : intermittent [Rest] : rest [Meds] : meds [Ice] : ice [Heat] : heat [Massage] : massage [Sitting] : sitting [de-identified] : 4.17.24:  72 yo LDH M Patient here for neck and upper back pain. 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

## 2024-07-01 NOTE — ED PROVIDER NOTE - CARDIAC RHYTHM
07/24 OV Vit D was low at 17.6, the patient was started on Vit D 50k units once a week for 3 months, iron studies and b12 wnl, no new issues as of late, patient reports he has not had any flare-ups lately, and notes occasional breakaway diarrhea w/o bleeding. No abdominal pain, denies N/V, unintentional weight loss, SOB/CP. regular

## 2024-07-02 ENCOUNTER — APPOINTMENT (OUTPATIENT)
Dept: PULMONOLOGY | Facility: CLINIC | Age: 74
End: 2024-07-02

## 2024-07-02 ENCOUNTER — APPOINTMENT (OUTPATIENT)
Dept: PULMONOLOGY | Facility: CLINIC | Age: 74
End: 2024-07-02
Payer: MEDICARE

## 2024-07-02 ENCOUNTER — RESULT CHARGE (OUTPATIENT)
Age: 74
End: 2024-07-02

## 2024-07-02 VITALS
RESPIRATION RATE: 16 BRPM | DIASTOLIC BLOOD PRESSURE: 61 MMHG | SYSTOLIC BLOOD PRESSURE: 104 MMHG | OXYGEN SATURATION: 100 % | HEART RATE: 67 BPM

## 2024-07-02 DIAGNOSIS — J45.909 UNSPECIFIED ASTHMA, UNCOMPLICATED: ICD-10-CM

## 2024-07-02 DIAGNOSIS — J44.9 CHRONIC OBSTRUCTIVE PULMONARY DISEASE, UNSPECIFIED: ICD-10-CM

## 2024-07-02 DIAGNOSIS — J47.9 BRONCHIECTASIS, UNCOMPLICATED: ICD-10-CM

## 2024-07-02 LAB — POCT - HEMOGLOBIN (HGB), QUANTITATIVE, TRANSCUTANEOUS: 11.4

## 2024-07-02 PROCEDURE — 99214 OFFICE O/P EST MOD 30 MIN: CPT | Mod: 25

## 2024-07-02 PROCEDURE — 94729 DIFFUSING CAPACITY: CPT

## 2024-07-02 PROCEDURE — 94010 BREATHING CAPACITY TEST: CPT

## 2024-07-02 PROCEDURE — 94727 GAS DIL/WSHOT DETER LNG VOL: CPT

## 2024-07-02 PROCEDURE — 88738 HGB QUANT TRANSCUTANEOUS: CPT

## 2024-12-05 DIAGNOSIS — M25.562 PAIN IN LEFT KNEE: ICD-10-CM

## 2024-12-06 ENCOUNTER — APPOINTMENT (OUTPATIENT)
Dept: ORTHOPEDIC SURGERY | Facility: CLINIC | Age: 74
End: 2024-12-06
Payer: MEDICARE

## 2024-12-06 VITALS — BODY MASS INDEX: 21.77 KG/M2 | HEIGHT: 69 IN | WEIGHT: 147 LBS

## 2024-12-06 DIAGNOSIS — M17.12 UNILATERAL PRIMARY OSTEOARTHRITIS, LEFT KNEE: ICD-10-CM

## 2024-12-06 PROCEDURE — 20610 DRAIN/INJ JOINT/BURSA W/O US: CPT | Mod: LT

## 2024-12-06 PROCEDURE — 99214 OFFICE O/P EST MOD 30 MIN: CPT | Mod: 25

## 2024-12-11 ENCOUNTER — APPOINTMENT (OUTPATIENT)
Dept: ORTHOPEDIC SURGERY | Facility: CLINIC | Age: 74
End: 2024-12-11

## 2024-12-18 ENCOUNTER — APPOINTMENT (OUTPATIENT)
Dept: ORTHOPEDIC SURGERY | Facility: CLINIC | Age: 74
End: 2024-12-18
Payer: MEDICARE

## 2024-12-18 PROCEDURE — 20610 DRAIN/INJ JOINT/BURSA W/O US: CPT | Mod: LT

## 2024-12-27 ENCOUNTER — APPOINTMENT (OUTPATIENT)
Dept: ORTHOPEDIC SURGERY | Facility: CLINIC | Age: 74
End: 2024-12-27
Payer: MEDICARE

## 2024-12-27 PROCEDURE — 20610 DRAIN/INJ JOINT/BURSA W/O US: CPT | Mod: LT

## 2025-01-03 ENCOUNTER — APPOINTMENT (OUTPATIENT)
Dept: ORTHOPEDIC SURGERY | Facility: CLINIC | Age: 75
End: 2025-01-03
Payer: MEDICARE

## 2025-01-03 ENCOUNTER — APPOINTMENT (OUTPATIENT)
Dept: ORTHOPEDIC SURGERY | Facility: CLINIC | Age: 75
End: 2025-01-03

## 2025-01-03 DIAGNOSIS — M17.12 UNILATERAL PRIMARY OSTEOARTHRITIS, LEFT KNEE: ICD-10-CM

## 2025-01-03 PROCEDURE — 20610 DRAIN/INJ JOINT/BURSA W/O US: CPT | Mod: LT

## 2025-01-07 ENCOUNTER — APPOINTMENT (OUTPATIENT)
Dept: PULMONOLOGY | Facility: CLINIC | Age: 75
End: 2025-01-07

## 2025-01-07 ENCOUNTER — APPOINTMENT (OUTPATIENT)
Dept: PULMONOLOGY | Facility: CLINIC | Age: 75
End: 2025-01-07
Payer: MEDICARE

## 2025-01-07 VITALS — OXYGEN SATURATION: 93 % | SYSTOLIC BLOOD PRESSURE: 119 MMHG | HEART RATE: 72 BPM | DIASTOLIC BLOOD PRESSURE: 61 MMHG

## 2025-01-07 DIAGNOSIS — J47.9 BRONCHIECTASIS, UNCOMPLICATED: ICD-10-CM

## 2025-01-07 DIAGNOSIS — J45.909 UNSPECIFIED ASTHMA, UNCOMPLICATED: ICD-10-CM

## 2025-01-07 LAB — POCT - HEMOGLOBIN (HGB), QUANTITATIVE, TRANSCUTANEOUS: 13.6

## 2025-01-07 PROCEDURE — 94010 BREATHING CAPACITY TEST: CPT

## 2025-01-07 PROCEDURE — 95012 NITRIC OXIDE EXP GAS DETER: CPT

## 2025-01-07 PROCEDURE — 94729 DIFFUSING CAPACITY: CPT

## 2025-01-07 PROCEDURE — 94727 GAS DIL/WSHOT DETER LNG VOL: CPT

## 2025-01-07 PROCEDURE — 99213 OFFICE O/P EST LOW 20 MIN: CPT | Mod: 25

## 2025-01-07 PROCEDURE — 88738 HGB QUANT TRANSCUTANEOUS: CPT

## 2025-01-08 ENCOUNTER — APPOINTMENT (OUTPATIENT)
Dept: ORTHOPEDIC SURGERY | Facility: CLINIC | Age: 75
End: 2025-01-08

## 2025-03-26 ENCOUNTER — RX RENEWAL (OUTPATIENT)
Age: 75
End: 2025-03-26

## 2025-07-08 ENCOUNTER — APPOINTMENT (OUTPATIENT)
Dept: PULMONOLOGY | Facility: CLINIC | Age: 75
End: 2025-07-08

## 2025-07-08 ENCOUNTER — APPOINTMENT (OUTPATIENT)
Dept: PULMONOLOGY | Facility: CLINIC | Age: 75
End: 2025-07-08
Payer: MEDICARE

## 2025-07-08 VITALS
SYSTOLIC BLOOD PRESSURE: 94 MMHG | RESPIRATION RATE: 16 BRPM | DIASTOLIC BLOOD PRESSURE: 53 MMHG | OXYGEN SATURATION: 97 % | HEART RATE: 68 BPM

## 2025-07-08 LAB — POCT - HEMOGLOBIN (HGB), QUANTITATIVE, TRANSCUTANEOUS: 12.3

## 2025-07-08 PROCEDURE — 99214 OFFICE O/P EST MOD 30 MIN: CPT | Mod: 25

## 2025-07-08 PROCEDURE — 94729 DIFFUSING CAPACITY: CPT

## 2025-07-08 PROCEDURE — 94727 GAS DIL/WSHOT DETER LNG VOL: CPT

## 2025-07-08 PROCEDURE — 88738 HGB QUANT TRANSCUTANEOUS: CPT

## 2025-07-08 PROCEDURE — 94010 BREATHING CAPACITY TEST: CPT

## 2025-07-25 ENCOUNTER — NON-APPOINTMENT (OUTPATIENT)
Age: 75
End: 2025-07-25

## 2025-07-25 DIAGNOSIS — R91.8 OTHER NONSPECIFIC ABNORMAL FINDING OF LUNG FIELD: ICD-10-CM
